# Patient Record
Sex: FEMALE | Race: WHITE | HISPANIC OR LATINO | URBAN - METROPOLITAN AREA
[De-identification: names, ages, dates, MRNs, and addresses within clinical notes are randomized per-mention and may not be internally consistent; named-entity substitution may affect disease eponyms.]

---

## 2019-09-25 ENCOUNTER — IMPORTED ENCOUNTER (OUTPATIENT)
Dept: URBAN - METROPOLITAN AREA CLINIC 9 | Facility: CLINIC | Age: 54
End: 2019-09-25

## 2019-10-25 ENCOUNTER — IMPORTED ENCOUNTER (OUTPATIENT)
Dept: URBAN - METROPOLITAN AREA CLINIC 9 | Facility: CLINIC | Age: 54
End: 2019-10-25

## 2020-01-15 NOTE — PATIENT DISCUSSION
GCC abnormal OU. Borderline RNFL changes OD sup/inf, Borderline RNFL changes OS inf. RTC for VF 24-2.

## 2020-01-15 NOTE — PATIENT DISCUSSION
Good postoperative appearance. RECENT LABS                                     10.9   7.4   )-----------( 356      ( 2019 10:50 )             33.7   02-16    140  |  103  |  17  ----------------------------<  146<H>  3.8   |  25  |  0.48<L>    Ca    8.7      2019 10:50    TPro  6.2  /  Alb  3.2<L>  /  TBili  0.4  /  DBili  x   /  AST  19  /  ALT  44  /  AlkPhos  67  02-16      Urinalysis Basic - ( 2019 01:00 )    Color: Yellow / Appearance: Clear / S.025 / pH: x  Gluc: x / Ketone: Trace  / Bili: Negative / Urobili: 1 mg/dL   Blood: x / Protein: 30 mg/dL / Nitrite: Negative   Leuk Esterase: Negative / RBC: 0-2 /HPF / WBC 0-2   Sq Epi: x / Non Sq Epi: Occasional / Bacteria: x    Urine Culture NG

## 2021-04-21 ENCOUNTER — IMPORTED ENCOUNTER (OUTPATIENT)
Dept: URBAN - METROPOLITAN AREA CLINIC 9 | Facility: CLINIC | Age: 56
End: 2021-04-21

## 2021-04-21 PROBLEM — G51.31: Noted: 2021-04-21

## 2021-10-12 ENCOUNTER — CLINIC PROCEDURE ONLY (OUTPATIENT)
Dept: URBAN - METROPOLITAN AREA CLINIC 14 | Facility: CLINIC | Age: 56
End: 2021-10-12

## 2021-10-12 DIAGNOSIS — G51.31: ICD-10-CM

## 2021-10-12 PROCEDURE — 64612 DESTROY NERVE FACE MUSCLE: CPT

## 2021-10-12 ASSESSMENT — VISUAL ACUITY
OS_SC: 20/25
OD_SC: 20/25

## 2021-10-16 ASSESSMENT — VISUAL ACUITY
OD_CC: 20/20 SN
OS_CC: 20/20 SN
OD_CC: 20/20 SN
OS_CC: 20/20 SN
OS_CC: 20/20 SN
OD_CC: 20/20 SN

## 2022-01-21 ENCOUNTER — CLINIC PROCEDURE ONLY (OUTPATIENT)
Dept: URBAN - METROPOLITAN AREA CLINIC 14 | Facility: CLINIC | Age: 57
End: 2022-01-21

## 2022-01-21 DIAGNOSIS — G51.31: ICD-10-CM

## 2022-01-21 DIAGNOSIS — L98.8: ICD-10-CM

## 2022-01-21 PROCEDURE — 64612 DESTROY NERVE FACE MUSCLE: CPT

## 2022-01-21 PROCEDURE — 64612C BOTOX/COSMETIC

## 2022-01-21 ASSESSMENT — VISUAL ACUITY
OD_SC: 20/25
OS_SC: 20/25

## 2022-03-16 ENCOUNTER — ESTABLISHED PATIENT (OUTPATIENT)
Dept: URBAN - METROPOLITAN AREA CLINIC 14 | Facility: CLINIC | Age: 57
End: 2022-03-16

## 2022-03-16 DIAGNOSIS — L98.8: ICD-10-CM

## 2022-03-16 DIAGNOSIS — H43.813: ICD-10-CM

## 2022-03-16 DIAGNOSIS — G51.31: ICD-10-CM

## 2022-03-16 PROCEDURE — 92015 DETERMINE REFRACTIVE STATE: CPT

## 2022-03-16 PROCEDURE — 92134 CPTRZ OPH DX IMG PST SGM RTA: CPT

## 2022-03-16 PROCEDURE — 92014 COMPRE OPH EXAM EST PT 1/>: CPT

## 2022-03-16 ASSESSMENT — VISUAL ACUITY
OD_CC: 20/25+2
OS_CC: 20/25

## 2022-03-16 ASSESSMENT — TONOMETRY
OS_IOP_MMHG: 14
OD_IOP_MMHG: 14

## 2022-04-11 ENCOUNTER — ESTABLISHED PATIENT (OUTPATIENT)
Dept: URBAN - METROPOLITAN AREA CLINIC 14 | Facility: CLINIC | Age: 57
End: 2022-04-11

## 2022-04-11 DIAGNOSIS — H43.813: ICD-10-CM

## 2022-04-11 PROCEDURE — 92134 CPTRZ OPH DX IMG PST SGM RTA: CPT

## 2022-04-11 PROCEDURE — 92014 COMPRE OPH EXAM EST PT 1/>: CPT

## 2022-04-11 ASSESSMENT — TONOMETRY
OD_IOP_MMHG: 15
OS_IOP_MMHG: 16

## 2022-04-11 ASSESSMENT — VISUAL ACUITY
OD_CC: 20/20-1
OS_CC: 20/20-2

## 2022-04-22 ENCOUNTER — CLINIC PROCEDURE ONLY (OUTPATIENT)
Dept: URBAN - METROPOLITAN AREA CLINIC 14 | Facility: CLINIC | Age: 57
End: 2022-04-22

## 2022-04-22 DIAGNOSIS — G51.31: ICD-10-CM

## 2022-04-22 PROCEDURE — 64612 DESTROY NERVE FACE MUSCLE: CPT

## 2022-04-22 ASSESSMENT — VISUAL ACUITY
OS_CC: 20/20-2
OD_CC: 20/20-1

## 2022-06-24 RX ORDER — METHYLPREDNISOLONE 4 MG/1
TABLET ORAL
COMMUNITY
Start: 2021-04-20

## 2022-07-13 ENCOUNTER — ESTABLISHED PATIENT (OUTPATIENT)
Dept: URBAN - METROPOLITAN AREA CLINIC 14 | Facility: CLINIC | Age: 57
End: 2022-07-13

## 2022-07-13 DIAGNOSIS — G51.31: ICD-10-CM

## 2022-07-13 PROCEDURE — 99211NC NO CHARGE VISIT

## 2022-07-13 ASSESSMENT — VISUAL ACUITY
OD_SC: 20/20-1
OS_SC: 20/20-2

## 2022-07-26 ENCOUNTER — CLINIC PROCEDURE ONLY (OUTPATIENT)
Dept: URBAN - METROPOLITAN AREA CLINIC 14 | Facility: CLINIC | Age: 57
End: 2022-07-26

## 2022-07-26 DIAGNOSIS — G51.31: ICD-10-CM

## 2022-07-26 PROCEDURE — 64612 DESTROY NERVE FACE MUSCLE: CPT

## 2022-07-26 ASSESSMENT — VISUAL ACUITY
OS_SC: 20/20-2
OD_SC: 20/20-1

## 2022-10-25 ENCOUNTER — CLINIC PROCEDURE ONLY (OUTPATIENT)
Dept: URBAN - METROPOLITAN AREA CLINIC 14 | Facility: CLINIC | Age: 57
End: 2022-10-25

## 2022-10-25 DIAGNOSIS — G51.31: ICD-10-CM

## 2022-10-25 PROCEDURE — 64612 DESTROY NERVE FACE MUSCLE: CPT

## 2022-10-25 ASSESSMENT — VISUAL ACUITY
OS_SC: 20/20-2
OD_SC: 20/20-1

## 2023-01-24 ENCOUNTER — CLINIC PROCEDURE ONLY (OUTPATIENT)
Dept: URBAN - METROPOLITAN AREA CLINIC 14 | Facility: CLINIC | Age: 58
End: 2023-01-24

## 2023-01-24 DIAGNOSIS — G51.31: ICD-10-CM

## 2023-01-24 PROCEDURE — 64612C BOTOX/COSMETIC

## 2023-01-24 ASSESSMENT — VISUAL ACUITY
OD_SC: 20/20-1
OS_SC: 20/20-2

## 2023-04-25 ENCOUNTER — CLINIC PROCEDURE ONLY (OUTPATIENT)
Dept: URBAN - METROPOLITAN AREA CLINIC 14 | Facility: CLINIC | Age: 58
End: 2023-04-25

## 2023-04-25 DIAGNOSIS — G51.31: ICD-10-CM

## 2023-04-25 PROCEDURE — 64612 DESTROY NERVE FACE MUSCLE: CPT

## 2023-04-25 ASSESSMENT — VISUAL ACUITY
OD_SC: 20/20-1
OS_SC: 20/20-2

## 2023-08-04 ENCOUNTER — CLINIC PROCEDURE ONLY (OUTPATIENT)
Dept: URBAN - METROPOLITAN AREA CLINIC 14 | Facility: CLINIC | Age: 58
End: 2023-08-04

## 2023-08-04 DIAGNOSIS — G51.31: ICD-10-CM

## 2023-08-04 PROCEDURE — 64612 DESTROY NERVE FACE MUSCLE: CPT

## 2023-08-04 ASSESSMENT — VISUAL ACUITY
OD_SC: 20/20-1
OS_SC: 20/20-2

## 2024-01-05 ENCOUNTER — CLINIC PROCEDURE ONLY (OUTPATIENT)
Dept: URBAN - METROPOLITAN AREA CLINIC 14 | Facility: CLINIC | Age: 59
End: 2024-01-05

## 2024-01-05 DIAGNOSIS — G51.31: ICD-10-CM

## 2024-01-05 PROCEDURE — 64612 DESTROY NERVE FACE MUSCLE: CPT

## 2024-01-05 ASSESSMENT — VISUAL ACUITY
OD_SC: 20/20-1
OS_SC: 20/20-2

## 2024-01-26 ENCOUNTER — ESTABLISHED PATIENT (OUTPATIENT)
Dept: URBAN - METROPOLITAN AREA CLINIC 14 | Facility: CLINIC | Age: 59
End: 2024-01-26

## 2024-01-26 ASSESSMENT — KERATOMETRY
OD_K1POWER_DIOPTERS: 44.00
OS_AXISANGLE_DEGREES: 174
OD_K2POWER_DIOPTERS: 44.75
OD_AXISANGLE2_DEGREES: 91
OS_K1POWER_DIOPTERS: 43.75
OS_K2POWER_DIOPTERS: 45.50
OD_AXISANGLE_DEGREES: 1
OS_AXISANGLE2_DEGREES: 84

## 2024-01-26 ASSESSMENT — VISUAL ACUITY
OD_CC: 20/30-1
OS_CC: 20/30-1

## 2024-01-26 ASSESSMENT — TONOMETRY
OS_IOP_MMHG: 12
OD_IOP_MMHG: 13